# Patient Record
Sex: FEMALE | Race: WHITE | Employment: OTHER | URBAN - METROPOLITAN AREA
[De-identification: names, ages, dates, MRNs, and addresses within clinical notes are randomized per-mention and may not be internally consistent; named-entity substitution may affect disease eponyms.]

---

## 2017-08-28 NOTE — PATIENT DISCUSSION
REEVAL VERTICAL DIPLOPIA POST YAG. BELIEVE THAT DISCRIPENCY IN [place] WHEN LOOKING  OUT OF  OU COULD BE DUE TO PC HAZE , AND CHRONIC MP OD.

## 2018-04-20 NOTE — PATIENT DISCUSSION
ARTIFICIAL TEARS to affected eye(s) as needed.
BMI Within normal limits, continue current management.
CLEAR FOR ANDREA.
Capsular haze appears visually significant, RECOMMEND CONSULT with Anterior Segment surgeon for possible YAG capsulotomy.
Does NOT APPEAR VISUALLY SIGNIFICANT.
ERM appears BODERLINE VISUALLY SIGNIFICANT.
HYPERTONIC OINTMENT 5% QHS.
HYPERTONIC SALINE 5% T gtt QID.
MONITOR response to TREATMENT.
My findings and recommendations are based on patient's symptoms, eye exam, diagnostic testing, and records.
NO PROGRESSION ON OCT.
No retinal holes or tears seen on exam. Recommended OBSERVATION. We reviewed the signs and symptoms of retinal tear/retinal detachment and the importance of prompt evaluation should there be increasing floaters, new flashing lights, or decreasing peripheral vision in either eye at any time. Patient understands condition, prognosis and need for follow up care.
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
PROM - NO LEAK ON FA DONE [phone] 17.
RECOM EVAL WITH DR LEMUS IN 1 WK.
REEVAL HW IN 4 MONTHS.
REEVAL VERTICAL DIPLOPIA POST YAG. BELIEVE THAT DISCRIPENCY IN [place] WHEN LOOKING  OUT OF  OU COULD BE DUE TO PC HAZE , AND CHRONIC MP OD.
Recommend OBSERVATION and continued MONITORING for progression to exudative AMD.
Recommend OBSERVATION and continued MONITORING for progression.
Recommended OBSERVATION AND MONITORING FOR PROGRESSION.
Recommended OBSERVATION and MONITORING for change.
Recommended OBSERVATION and continued MONITORING for progression.
Recommended weight and BMI control through healthy diet and exercise, green leafy veggies, UV protection, and not smoking. Reviewed the benefits of AREDS II VITAMINS VERUS GENOTYPE directed vitamin therapy, and recommended following one or the other to try and prevent the progression of the disease. Reviewed the importance of daily monitoring of the vision in each eye independently, along with the use of the Amsler grid daily and instructed patient to call and return immediately for any new changes in their vision or on the Amsler grid. Patient instructed on the importance of regular follow up and monitoring for the early detection of conversion to wet AMD as early detection results in early treatment and better outcomes.
SEEMS CHRONIC PER HISTORY.
Well positioned.
never used

## 2018-07-20 NOTE — PATIENT DISCUSSION
7/27/18-PATIENT WAS NOT SEEN THIS DAY HAD TO LEAVE FOR DR VALENZUELA//AW  SCHEDULED NEXT WEEK TO EVAL AND GO OVER TESTING.

## 2019-11-13 ENCOUNTER — NEW PATIENT COMPREHENSIVE (OUTPATIENT)
Dept: URBAN - METROPOLITAN AREA CLINIC 36 | Facility: CLINIC | Age: 69
End: 2019-11-13

## 2019-11-13 DIAGNOSIS — H52.7: ICD-10-CM

## 2019-11-13 DIAGNOSIS — H25.811: ICD-10-CM

## 2019-11-13 DIAGNOSIS — H25.812: ICD-10-CM

## 2019-11-13 PROCEDURE — 92015 DETERMINE REFRACTIVE STATE: CPT

## 2019-11-13 PROCEDURE — 92004 COMPRE OPH EXAM NEW PT 1/>: CPT

## 2019-11-13 ASSESSMENT — VISUAL ACUITY
OD_SC: 20/100
OD_SC: J10
OS_CC: J2
OS_CC: 20/25
OD_PH: 20/30+2
OD_CC: 20/40+2
OS_SC: 20/70
OD_CC: J3
OS_SC: J8

## 2019-11-13 ASSESSMENT — TONOMETRY
OD_IOP_MMHG: 14
OS_IOP_MMHG: 14

## 2019-12-17 ENCOUNTER — CATARACT CONSULT (OUTPATIENT)
Dept: URBAN - METROPOLITAN AREA CLINIC 43 | Facility: CLINIC | Age: 69
End: 2019-12-17

## 2019-12-17 DIAGNOSIS — H53.031: ICD-10-CM

## 2019-12-17 DIAGNOSIS — H02.002: ICD-10-CM

## 2019-12-17 DIAGNOSIS — H18.51: ICD-10-CM

## 2019-12-17 DIAGNOSIS — H04.123: ICD-10-CM

## 2019-12-17 DIAGNOSIS — H25.812: ICD-10-CM

## 2019-12-17 DIAGNOSIS — H25.811: ICD-10-CM

## 2019-12-17 PROCEDURE — V2799PMN IMPRIMIS PRED-MOXI-NEPAF 5ML

## 2019-12-17 PROCEDURE — 92025-2 CORNEAL TOPOGRAPHY, PT

## 2019-12-17 PROCEDURE — 99214 OFFICE O/P EST MOD 30 MIN: CPT

## 2019-12-17 PROCEDURE — 92136TC INTERFEROMETRY - TECHNICAL COMPONENT

## 2019-12-17 PROCEDURE — 92286 ANT SGM IMG I&R SPECLR MIC: CPT

## 2019-12-17 ASSESSMENT — VISUAL ACUITY
OS_BAT: 20/200
OS_SC: J12
OS_SC: 20/80-1
OD_SC: <J12
OD_CC: 20/40-2
OD_SC: 20/100-1
OS_AM: 20/20
OS_CC: 20/30-2
OD_RAM: 20/25
OD_CC: J3
OD_BAT: 20/200
OS_CC: J1-

## 2019-12-17 ASSESSMENT — TONOMETRY
OD_IOP_MMHG: 16
OS_IOP_MMHG: 15

## 2019-12-19 ENCOUNTER — RX CHANGE - NO APPT (OUTPATIENT)
Dept: URBAN - METROPOLITAN AREA CLINIC 36 | Facility: CLINIC | Age: 69
End: 2019-12-19

## 2019-12-30 ENCOUNTER — CONTACT LENS FOLLOW UP (OUTPATIENT)
Dept: URBAN - METROPOLITAN AREA CLINIC 36 | Facility: CLINIC | Age: 69
End: 2019-12-30

## 2019-12-30 DIAGNOSIS — Z97.3: ICD-10-CM

## 2019-12-30 PROCEDURE — 92310CV CL MGMNT CUSTOM VISION TRIAL ALL INCL

## 2019-12-30 ASSESSMENT — VISUAL ACUITY
OD_PH: 20/40+2
OS_CC: 20/25-2
OD_CC: J2
OD_SC: >J10
OD_SC: 20/100
OD_CC: 20/50
OS_SC: >J10
OS_SC: 20/80
OS_CC: J2

## 2019-12-31 ENCOUNTER — CONTACT LENS FOLLOW UP (OUTPATIENT)
Dept: URBAN - METROPOLITAN AREA CLINIC 36 | Facility: CLINIC | Age: 69
End: 2019-12-31

## 2019-12-31 DIAGNOSIS — H25.811: ICD-10-CM

## 2019-12-31 DIAGNOSIS — H25.812: ICD-10-CM

## 2019-12-31 DIAGNOSIS — Z97.3: ICD-10-CM

## 2019-12-31 PROCEDURE — 92310CV CL MGMNT CUSTOM VISION TRIAL ALL INCL

## 2019-12-31 ASSESSMENT — VISUAL ACUITY
OU_CC: 20/25
OU_CC: J3

## 2020-01-07 ENCOUNTER — FOLLOW UP (OUTPATIENT)
Dept: URBAN - METROPOLITAN AREA CLINIC 43 | Facility: CLINIC | Age: 70
End: 2020-01-07

## 2020-01-07 DIAGNOSIS — H25.812: ICD-10-CM

## 2020-01-07 DIAGNOSIS — H25.811: ICD-10-CM

## 2020-01-07 PROCEDURE — 99211 OFF/OP EST MAY X REQ PHY/QHP: CPT

## 2020-01-07 ASSESSMENT — VISUAL ACUITY
OS_BAT: 20/200
OD_SC: J12
OD_BAT: 20/200
OS_SC: 20/80
OD_RAM: 20/25
OS_AM: 20/20
OS_SC: <J12
OD_SC: 20/100

## 2020-01-20 ENCOUNTER — PRE-OP/H&P (OUTPATIENT)
Dept: URBAN - METROPOLITAN AREA CLINIC 39 | Facility: CLINIC | Age: 70
End: 2020-01-20

## 2020-01-20 ENCOUNTER — SURGERY/PROCEDURE (OUTPATIENT)
Dept: URBAN - METROPOLITAN AREA CLINIC 43 | Facility: CLINIC | Age: 70
End: 2020-01-20

## 2020-01-20 DIAGNOSIS — H53.031: ICD-10-CM

## 2020-01-20 DIAGNOSIS — H18.51: ICD-10-CM

## 2020-01-20 DIAGNOSIS — H25.811: ICD-10-CM

## 2020-01-20 DIAGNOSIS — H25.812: ICD-10-CM

## 2020-01-20 DIAGNOSIS — H02.002: ICD-10-CM

## 2020-01-20 DIAGNOSIS — H04.123: ICD-10-CM

## 2020-01-20 PROCEDURE — 65772LRI LRI DURING CAT SX

## 2020-01-20 PROCEDURE — 66999LNSR LENSAR LASER FOR CAT SX

## 2020-01-20 PROCEDURE — 66984CV REMOVE CATARACT, INSERT LENS, CUSTOM VISION

## 2020-01-20 PROCEDURE — 99211T TECH SERVICE

## 2020-01-21 ENCOUNTER — POST OP/EVAL OF SECOND EYE (OUTPATIENT)
Dept: URBAN - METROPOLITAN AREA CLINIC 36 | Facility: CLINIC | Age: 70
End: 2020-01-21

## 2020-01-21 DIAGNOSIS — H25.811: ICD-10-CM

## 2020-01-21 DIAGNOSIS — Z96.1: ICD-10-CM

## 2020-01-21 PROCEDURE — 99024 POSTOP FOLLOW-UP VISIT: CPT

## 2020-01-21 PROCEDURE — 92012 INTRM OPH EXAM EST PATIENT: CPT

## 2020-01-21 ASSESSMENT — VISUAL ACUITY
OD_SC: 20/70-1
OS_SC: 20/25-2
OD_SC: >J10
OD_PH: 20/60+2
OS_SC: J2

## 2020-01-21 ASSESSMENT — TONOMETRY
OD_IOP_MMHG: 12
OS_IOP_MMHG: 12

## 2020-01-27 ENCOUNTER — SURGERY/PROCEDURE (OUTPATIENT)
Dept: URBAN - METROPOLITAN AREA CLINIC 43 | Facility: CLINIC | Age: 70
End: 2020-01-27

## 2020-01-27 ENCOUNTER — PRE-OP/H&P (OUTPATIENT)
Dept: URBAN - METROPOLITAN AREA CLINIC 39 | Facility: CLINIC | Age: 70
End: 2020-01-27

## 2020-01-27 DIAGNOSIS — H25.811: ICD-10-CM

## 2020-01-27 DIAGNOSIS — Z96.1: ICD-10-CM

## 2020-01-27 PROCEDURE — 66984CV REMOVE CATARACT, INSERT LENS, CUSTOM VISION

## 2020-01-27 PROCEDURE — 66999LNSR LENSAR LASER FOR CAT SX

## 2020-01-27 PROCEDURE — 65772LRI LRI DURING CAT SX

## 2020-01-27 PROCEDURE — 99211T TECH SERVICE

## 2020-01-27 ASSESSMENT — VISUAL ACUITY
OD_CC: J3
OD_SC: <J12
OD_SC: 20/100-1
OS_SC: 20/20
OS_SC: J10
OD_BAT: 20/200
OD_CC: 20/40-2

## 2020-01-27 ASSESSMENT — TONOMETRY
OS_IOP_MMHG: 12
OD_IOP_MMHG: 11

## 2020-01-28 ENCOUNTER — CATARACT POST-OP 1-DAY (OUTPATIENT)
Dept: URBAN - METROPOLITAN AREA CLINIC 36 | Facility: CLINIC | Age: 70
End: 2020-01-28

## 2020-01-28 DIAGNOSIS — Z96.1: ICD-10-CM

## 2020-01-28 PROCEDURE — 99024 POSTOP FOLLOW-UP VISIT: CPT

## 2020-01-28 ASSESSMENT — TONOMETRY
OS_IOP_MMHG: 12
OD_IOP_MMHG: 12

## 2020-01-28 ASSESSMENT — VISUAL ACUITY
OS_SC: J2
OD_PH: 20/40
OD_SC: 20/70-1
OS_SC: 20/25-1
OD_SC: J3

## 2020-02-04 ENCOUNTER — POST-OP CATARACT (OUTPATIENT)
Dept: URBAN - METROPOLITAN AREA CLINIC 36 | Facility: CLINIC | Age: 70
End: 2020-02-04

## 2020-02-04 DIAGNOSIS — Z96.1: ICD-10-CM

## 2020-02-04 PROCEDURE — 99024 POSTOP FOLLOW-UP VISIT: CPT

## 2020-02-04 ASSESSMENT — VISUAL ACUITY
OD_PH: 20/50-1
OD_SC: J2
OS_SC: 20/25-1
OS_SC: J1
OD_SC: 20/100

## 2020-02-04 ASSESSMENT — TONOMETRY
OS_IOP_MMHG: 12
OD_IOP_MMHG: 11

## 2020-02-26 ENCOUNTER — POST-OP CATARACT (OUTPATIENT)
Dept: URBAN - METROPOLITAN AREA CLINIC 36 | Facility: CLINIC | Age: 70
End: 2020-02-26

## 2020-02-26 DIAGNOSIS — Z96.1: ICD-10-CM

## 2020-02-26 PROCEDURE — 99024 POSTOP FOLLOW-UP VISIT: CPT

## 2020-02-26 ASSESSMENT — VISUAL ACUITY
OD_SC: 20/80
OS_SC: 20/25-1
OD_PH: 20/60
OS_SC: J2
OD_SC: J5

## 2020-02-26 ASSESSMENT — TONOMETRY
OD_IOP_MMHG: 12
OS_IOP_MMHG: 12

## 2020-07-10 ENCOUNTER — ESTABLISHED COMPREHENSIVE EXAM (OUTPATIENT)
Dept: URBAN - METROPOLITAN AREA CLINIC 36 | Facility: CLINIC | Age: 70
End: 2020-07-10

## 2020-07-10 DIAGNOSIS — H18.51: ICD-10-CM

## 2020-07-10 DIAGNOSIS — H04.123: ICD-10-CM

## 2020-07-10 DIAGNOSIS — H52.7: ICD-10-CM

## 2020-07-10 DIAGNOSIS — H26.491: ICD-10-CM

## 2020-07-10 DIAGNOSIS — H26.492: ICD-10-CM

## 2020-07-10 DIAGNOSIS — H53.031: ICD-10-CM

## 2020-07-10 PROCEDURE — 92014 COMPRE OPH EXAM EST PT 1/>: CPT

## 2020-07-10 PROCEDURE — 92015 DETERMINE REFRACTIVE STATE: CPT

## 2020-07-10 ASSESSMENT — VISUAL ACUITY
OD_SC: 20/80
OD_SC: J2
OS_CC: J1+
OS_SC: J1
OD_CC: J2
OD_PH: 20/60
OS_SC: 20/25-1

## 2020-07-10 ASSESSMENT — TONOMETRY
OD_IOP_MMHG: 14
OS_IOP_MMHG: 12

## 2022-03-07 ENCOUNTER — EMERGENCY VISIT (OUTPATIENT)
Dept: URBAN - METROPOLITAN AREA CLINIC 36 | Facility: CLINIC | Age: 72
End: 2022-03-07

## 2022-03-07 DIAGNOSIS — H10.13: ICD-10-CM

## 2022-03-07 DIAGNOSIS — H04.123: ICD-10-CM

## 2022-03-07 PROCEDURE — 92014 COMPRE OPH EXAM EST PT 1/>: CPT

## 2022-03-07 ASSESSMENT — TONOMETRY
OD_IOP_MMHG: 12
OS_IOP_MMHG: 12

## 2022-03-07 ASSESSMENT — VISUAL ACUITY
OD_PH: 20/40-1
OS_SC: 20/25-1
OD_SC: 20/80

## 2023-01-30 ENCOUNTER — COMPREHENSIVE EXAM (OUTPATIENT)
Dept: URBAN - METROPOLITAN AREA CLINIC 36 | Facility: CLINIC | Age: 73
End: 2023-01-30

## 2023-01-30 DIAGNOSIS — H02.002: ICD-10-CM

## 2023-01-30 DIAGNOSIS — H10.13: ICD-10-CM

## 2023-01-30 DIAGNOSIS — H52.7: ICD-10-CM

## 2023-01-30 DIAGNOSIS — H26.493: ICD-10-CM

## 2023-01-30 DIAGNOSIS — H53.031: ICD-10-CM

## 2023-01-30 DIAGNOSIS — H04.123: ICD-10-CM

## 2023-01-30 PROCEDURE — 92014 COMPRE OPH EXAM EST PT 1/>: CPT

## 2023-01-30 PROCEDURE — 92015 DETERMINE REFRACTIVE STATE: CPT

## 2023-01-30 ASSESSMENT — VISUAL ACUITY
OS_SC: J2
OS_SC: 20/30-1
OD_PH: 20/40-2
OD_SC: 20/100
OD_SC: J3

## 2023-01-30 ASSESSMENT — TONOMETRY
OD_IOP_MMHG: 15
OS_IOP_MMHG: 17

## 2024-01-31 ENCOUNTER — ESTABLISHED PATIENT (OUTPATIENT)
Dept: URBAN - METROPOLITAN AREA CLINIC 36 | Facility: CLINIC | Age: 74
End: 2024-01-31

## 2024-01-31 DIAGNOSIS — H26.493: ICD-10-CM

## 2024-01-31 DIAGNOSIS — H04.123: ICD-10-CM

## 2024-01-31 DIAGNOSIS — H10.45: ICD-10-CM

## 2024-01-31 DIAGNOSIS — H02.002: ICD-10-CM

## 2024-01-31 DIAGNOSIS — H18.513: ICD-10-CM

## 2024-01-31 DIAGNOSIS — H53.031: ICD-10-CM

## 2024-01-31 PROCEDURE — 92014 COMPRE OPH EXAM EST PT 1/>: CPT

## 2024-01-31 ASSESSMENT — TONOMETRY
OD_IOP_MMHG: 16
OS_IOP_MMHG: 16

## 2024-01-31 ASSESSMENT — VISUAL ACUITY
OD_PH: 20/60
OD_SC: J3
OD_SC: 20/80
OD_CC: J1
OS_CC: J1+
OS_SC: J3
OS_SC: 20/25

## 2025-02-03 ENCOUNTER — COMPREHENSIVE EXAM (OUTPATIENT)
Age: 75
End: 2025-02-03

## 2025-02-03 DIAGNOSIS — H10.45: ICD-10-CM

## 2025-02-03 DIAGNOSIS — H18.513: ICD-10-CM

## 2025-02-03 DIAGNOSIS — H02.002: ICD-10-CM

## 2025-02-03 DIAGNOSIS — H53.031: ICD-10-CM

## 2025-02-03 DIAGNOSIS — H26.493: ICD-10-CM

## 2025-02-03 DIAGNOSIS — H04.123: ICD-10-CM

## 2025-02-03 PROCEDURE — 99213 OFFICE O/P EST LOW 20 MIN: CPT
